# Patient Record
Sex: FEMALE
[De-identification: names, ages, dates, MRNs, and addresses within clinical notes are randomized per-mention and may not be internally consistent; named-entity substitution may affect disease eponyms.]

---

## 2017-06-19 ENCOUNTER — HOSPITAL ENCOUNTER (EMERGENCY)
Dept: HOSPITAL 31 - C.ER | Age: 27
Discharge: HOME | End: 2017-06-19
Payer: COMMERCIAL

## 2017-06-19 VITALS — TEMPERATURE: 98.6 F | HEART RATE: 80 BPM

## 2017-06-19 VITALS — DIASTOLIC BLOOD PRESSURE: 73 MMHG | SYSTOLIC BLOOD PRESSURE: 124 MMHG

## 2017-06-19 VITALS — RESPIRATION RATE: 16 BRPM

## 2017-06-19 VITALS — OXYGEN SATURATION: 98 %

## 2017-06-19 DIAGNOSIS — Z32.01: Primary | ICD-10-CM

## 2017-06-19 LAB
BILIRUB UR-MCNC: NEGATIVE MG/DL
GLUCOSE UR STRIP-MCNC: NORMAL MG/DL
KETONES UR STRIP-MCNC: NEGATIVE MG/DL
LEUKOCYTE ESTERASE UR-ACNC: (no result) LEU/UL
PH UR STRIP: 5 [PH] (ref 5–8)
PROT UR STRIP-MCNC: (no result) MG/DL
RBC # UR STRIP: NEGATIVE /UL
RBC #/AREA URNS HPF: < 1 /HPF (ref 0–3)
SP GR UR STRIP: 1.03 (ref 1–1.03)
UROBILINOGEN UR-MCNC: NORMAL MG/DL (ref 0.2–1)
WBC #/AREA URNS HPF: 1 /HPF (ref 0–5)

## 2017-06-19 NOTE — C.PDOC
History Of Present Illness


27 y/o F p/w abdominal pain x 1 week. Patient states she has diarrhea and 

nausea. She denies any vaginal bleeding or dysuria. She does note that she is 

attempting to get pregnant and primarily came to the ER for a pregnancy 

confirmation. She notes some yellow discharge earlier but denies any today.


Time Seen by Provider: 06/19/17 10:55


Chief Complaint (Nursing): Abdominal Pain





Past Medical History


Vital Signs: 


 Last Vital Signs











Temp  98.6 F   06/19/17 13:26


 


Pulse  80   06/19/17 13:26


 


Resp  16   06/19/17 13:26


 


BP  128/88   06/19/17 13:26


 


Pulse Ox  100   06/19/17 13:26











Family History: States: No Known Family Hx





- Social History


Hx Tobacco Use: No


Hx Alcohol Use: No


Hx Substance Use: No





- Immunization History


Hx Tetanus Toxoid Vaccination: No


Hx Influenza Vaccination: Yes


Hx Pneumococcal Vaccination: No





Review Of Systems


Except As Marked, All Systems Reviewed And Found Negative.


Constitutional: Negative for: Fever


Cardiovascular: Negative for: Chest Pain





Physical Exam





- Physical Exam


Additional Physical Exam Comments: 





Constitutional: No acute distress.


Head: Normocephalic. Atraumatic.


Eyes: PERRL.


ENT: Moist mucous membranes.


Neck: Supple.


Cardiovascular: Regular rate. Radial pulse 2+ bilaterally.


Chest: No tenderness.


Respiratory: Clear to auscultation bilaterally.


GI: Soft. Nontender. Nondistended.


Pelvic: Os closed. No CMT or adnexal tenderness. No discharge or bleeding.


Back: No CVA tenderness.


Musculoskeletal: No tenderness or swelling of extremities.


Skin: No rash.


Neurologic: Alert, no focal deficit





ED Course And Treatment


O2 Sat by Pulse Oximetry: 98





Medical Decision Making


Medical Decision Making: 


Patient pregnant. States she will follow up with OBGYN. Instructed to return to 

the ER immediately for pain or bleeding.





Disposition





- Disposition


Disposition: HOME/ ROUTINE


Disposition Time: 13:34


Condition: STABLE


Instructions:  Pregnancy (ED)





- Clinical Impression


Clinical Impression: 


 Pregnant

## 2017-07-27 ENCOUNTER — HOSPITAL ENCOUNTER (EMERGENCY)
Dept: HOSPITAL 31 - C.ER | Age: 27
Discharge: HOME | End: 2017-07-27
Payer: MEDICAID

## 2017-07-27 VITALS
DIASTOLIC BLOOD PRESSURE: 70 MMHG | HEART RATE: 60 BPM | SYSTOLIC BLOOD PRESSURE: 111 MMHG | RESPIRATION RATE: 16 BRPM | TEMPERATURE: 98.6 F

## 2017-07-27 VITALS — OXYGEN SATURATION: 100 %

## 2017-07-27 DIAGNOSIS — Z3A.09: ICD-10-CM

## 2017-07-27 DIAGNOSIS — O20.0: Primary | ICD-10-CM

## 2017-07-27 LAB
ALBUMIN/GLOB SERPL: 1.3 {RATIO} (ref 1–2.1)
ALP SERPL-CCNC: 56 U/L (ref 38–126)
ALT SERPL-CCNC: 29 U/L (ref 9–52)
AST SERPL-CCNC: 28 U/L (ref 14–36)
BASOPHILS # BLD AUTO: 0 K/UL (ref 0–0.2)
BASOPHILS NFR BLD: 0.5 % (ref 0–2)
BILIRUB SERPL-MCNC: 0.5 MG/DL (ref 0.2–1.3)
BILIRUB UR-MCNC: NEGATIVE MG/DL
BUN SERPL-MCNC: 6 MG/DL (ref 7–17)
CALCIUM SERPL-MCNC: 9.1 MG/DL (ref 8.6–10.4)
CHLORIDE SERPL-SCNC: 101 MMOL/L (ref 98–107)
CO2 SERPL-SCNC: 22 MMOL/L (ref 22–30)
EOSINOPHIL # BLD AUTO: 0 K/UL (ref 0–0.7)
EOSINOPHIL NFR BLD: 0.9 % (ref 0–4)
ERYTHROCYTE [DISTWIDTH] IN BLOOD BY AUTOMATED COUNT: 15.1 % (ref 11.5–14.5)
GLOBULIN SER-MCNC: 3.1 GM/DL (ref 2.2–3.9)
GLUCOSE SERPL-MCNC: 77 MG/DL (ref 65–105)
GLUCOSE UR STRIP-MCNC: NORMAL MG/DL
HCT VFR BLD CALC: 34.4 % (ref 34–47)
KETONES UR STRIP-MCNC: NEGATIVE MG/DL
LEUKOCYTE ESTERASE UR-ACNC: (no result) LEU/UL
LYMPHOCYTES # BLD AUTO: 1.4 K/UL (ref 1–4.3)
LYMPHOCYTES NFR BLD AUTO: 33.2 % (ref 20–40)
MCH RBC QN AUTO: 27.8 PG (ref 27–31)
MCHC RBC AUTO-ENTMCNC: 32.8 G/DL (ref 33–37)
MCV RBC AUTO: 84.7 FL (ref 81–99)
MONOCYTES # BLD: 0.5 K/UL (ref 0–0.8)
MONOCYTES NFR BLD: 10.9 % (ref 0–10)
NRBC BLD AUTO-RTO: 0 % (ref 0–2)
PH UR STRIP: 7 [PH] (ref 5–8)
PLATELET # BLD: 210 K/UL (ref 130–400)
PMV BLD AUTO: 9.6 FL (ref 7.2–11.7)
POTASSIUM SERPL-SCNC: 3.7 MMOL/L (ref 3.6–5.2)
PROT SERPL-MCNC: 7 G/DL (ref 6.3–8.3)
PROT UR STRIP-MCNC: NEGATIVE MG/DL
RBC # UR STRIP: NEGATIVE /UL
RBC #/AREA URNS HPF: 1 /HPF (ref 0–3)
SODIUM SERPL-SCNC: 137 MMOL/L (ref 132–148)
SP GR UR STRIP: 1.01 (ref 1–1.03)
UROBILINOGEN UR-MCNC: NORMAL MG/DL (ref 0.2–1)
WBC # BLD AUTO: 4.3 K/UL (ref 4.8–10.8)
WBC #/AREA URNS HPF: < 1 /HPF (ref 0–5)

## 2017-07-27 NOTE — US
Indication: Bleeding in pregnancy 



Comparison: None available 



Technique: Transabdominal pelvic ultrasound 



Findings: 



Uterus measures approximately 9.0 x 7.5 x 8.0 cm.  Anteverted.  

Cervix length measures approximately 2.9 cm. 



There is a single intrauterine fetus present.  The gestational sac 

measures 4.4 cm and is compatible with a gestational age of 9 weeks 6 

days. The crown-rump length measures 3.2 cm and is compatible with a 

gestational age of 10 weeks 0 days. 



There is fetal heart motion which measured 154.4 BPM. 



The right ovary measures 3.1 x 1.9 x 2.5 cm. The left ovary measures 

2.7 x 2.4 x 3.3 cm. Blood flow was demonstrated to both ovaries. 



Impression: 



Live single intrauterine pregnancy with estimated gestational age 9 

weeks 6 days by gestational sac calculation and 10 weeks 0 days by 

crown-rump length calculation.  Fetal heart rate 154.4 bpm. 



Advise an anomaly screen at 16-18 weeks gestational age

## 2017-07-27 NOTE — C.PDOC
History Of Present Illness





25 y/o female, , LMP 17 presents to emergency department with 

complaint of vaginal bleeding last night associated with lower abdominal pain. 

Patient states bleeding stopped today; now c/o upper back pain and headache. 

Denies any recent sonogram. Otherwise, denies urinary symptoms, fever, chills, 

nausea, vomiting, or other complaints. 


Time Seen by Provider: 17 11:14


Chief Complaint (Nursing): Female Genitourinary


History Per: Patient


History/Exam Limitations: no limitations


Onset/Duration Of Symptoms: Days


Current Symptoms Are (Timing): Still Present


Quality Of Discomfort: "Pain"


Associated Symptoms: denies: Fever, Chills, Nausea, Vomiting, Urinary Symptoms


Recent travel outside of the [place]: No


Abnormal Vaginal Bleeding: No





Past Medical History


Reviewed: Historical Data, Nursing Documentation, Vital Signs


Vital Signs: 


 Last Vital Signs











Temp  98.6 F   17 14:32


 


Pulse  60   17 14:32


 


Resp  16   17 14:32


 


BP  111/70   17 14:32


 


Pulse Ox  100   17 14:32














- Medical History


PMH: No Chronic Diseases


Family History: States: Unknown Family Hx





- Social History


Hx Tobacco Use: No


Hx Alcohol Use: No


Hx Substance Use: No





- Immunization History


Hx Tetanus Toxoid Vaccination: No


Hx Influenza Vaccination: Yes


Hx Pneumococcal Vaccination: No





Review Of Systems


Constitutional: Negative for: Fever, Chills


Cardiovascular: Negative for: Chest Pain


Respiratory: Negative for: Cough, Shortness of Breath, Wheezing


Gastrointestinal: Negative for: Nausea, Vomiting


Genitourinary: Positive for: Vaginal Bleeding (resolved)


Musculoskeletal: Positive for: Back Pain


Skin: Negative for: Rash


Neurological: Positive for: Headache.  Negative for: Weakness, Numbness, 

Dizziness





Physical Exam





- Physical Exam


Appears: Non-toxic, No Acute Distress


Skin: Normal Color, Warm, Dry


Head: Atraumatic, Normacephalic


Eye(s): bilateral: Normal Inspection, PERRL, EOMI


Nose: Normal


Oral Mucosa: Moist


Neck: Normal ROM, Supple


Chest: Symmetrical


Cardiovascular: Rhythm Regular, No Murmur


Respiratory: Normal Breath Sounds, No Rales, No Rhonchi, No Wheezing


Gastrointestinal/Abdominal: Soft, No Tenderness, No Guarding, No Rebound


Back: No Vertebral Tenderness, Paraspinal Tenderness (thoracic, bilaterally)


Pelvic: Normal Bimanual Exam, No Vaginal Bleeding, No Vaginal Discharge, No 

Cervical Motion Tenderness, No Adnexal Tenderness


Extremity: Normal ROM, Capillary Refill (< 2 sec.  )


Neurological/Psych: Oriented x3, Normal Speech, Normal Cognition





ED Course And Treatment





- Laboratory Results


Result Diagrams: 


 17 12:51





 17 12:51


O2 Sat by Pulse Oximetry: 100 (RA)


Pulse Ox Interpretation: Normal





- CT Scan/US


  ** US Pregnancy 1st Trimester


Other Rad Studies (CT/US): Read By Radiologist, Radiology Report Reviewed


CT/US Interpretation: Findings:  Uterus measures approximately 9.0 x 7.5 x 8.0 

cm.  Anteverted.  Cervix length measures approximately 2.9 cm.  There is a 

single intrauterine fetus present.  The gestational sac measures 4.4 cm and is 

compatible with a gestational age of 9 weeks 6 days. The crown-rump length 

measures 3.2 cm and is compatible with a gestational age of 10 weeks 0 days.  

There is fetal heart motion which measured 154.4 BPM.  The right ovary measures 

3.1 x 1.9 x 2.5 cm. The left ovary measures 2.7 x 2.4 x 3.3 cm. Blood flow was 

demonstrated to both ovaries.  Impression:  Live single intrauterine pregnancy 

with estimated gestational age 9 weeks 6 days by gestational sac calculation 

and 10 weeks 0 days by crown-rump length calculation.  Fetal heart rate 154.4 

bpm.  Advise an anomaly screen at 16-18 weeks gestational age





Medical Decision Making


Medical Decision Making: 





Plan:


* Labs


* UA


* Ultrasound


* Reassess





Prior Visits:


Notes and results from previous visits were reviewed





Progress Notes: pt reports headache and upper back pain resolved. pt has no 

bleeding at this time, will d/c pt with lab results and sono results to bring 

to her gyn appt this coming tues. 





Disposition


Counseled Patient/Family Regarding: Studies Performed, Diagnosis, Need For 

Followup





- Disposition


Disposition: HOME/ ROUTINE


Disposition Time: 15:04


Condition: STABLE


Additional Instructions: 





Siga con chauhan mdico el joe segn lo programado; Traiga resutls del 

laboratorio y resultados del sonograma con usted. Nada en la vagina hasta que 

sea visto por chauhan gineclogo, sin sexo, sin tampones. Regrese a ER para 

cualquier sangrado abundante (ms de jaswant almohadilla por hora); O cualquier 

otra preocupaci


Forms:  [facility] (English), Gen Discharge Inst [facility]





- Clinical Impression


Clinical Impression: 


 Threatened 








- PA / NP / Resident Statement


MD/DO has reviewed & agrees with the documentation as recorded.





- Scribe Statement


The provider has reviewed the documentation as recorded by the Scribe





Jerel Silver





All medical record entries made by the Scribe were at my direction and 

personally dictated by me. I have reviewed the chart and agree that the record 

accurately reflects my personal performance of the history, physical exam, 

medical decision making, and the department course for this patient. I have 

also personally directed, reviewed, and agree with the discharge instructions 

and disposition.

## 2018-02-23 ENCOUNTER — HOSPITAL ENCOUNTER (INPATIENT)
Dept: HOSPITAL 31 - C.EROB | Age: 28
LOS: 2 days | Discharge: HOME | DRG: 373 | End: 2018-02-25
Attending: OBSTETRICS & GYNECOLOGY | Admitting: OBSTETRICS & GYNECOLOGY
Payer: COMMERCIAL

## 2018-02-23 DIAGNOSIS — Z3A.40: ICD-10-CM

## 2018-02-23 LAB
BASOPHILS # BLD AUTO: 0 K/UL (ref 0–0.2)
BASOPHILS NFR BLD: 0.3 % (ref 0–2)
BILIRUB UR-MCNC: NEGATIVE MG/DL
EOSINOPHIL # BLD AUTO: 0.1 K/UL (ref 0–0.7)
EOSINOPHIL NFR BLD: 0.7 % (ref 0–4)
ERYTHROCYTE [DISTWIDTH] IN BLOOD BY AUTOMATED COUNT: 15.1 % (ref 11.5–14.5)
GLUCOSE UR STRIP-MCNC: NORMAL MG/DL
HGB BLD-MCNC: 10.9 G/DL (ref 11–16)
LEUKOCYTE ESTERASE UR-ACNC: (no result) LEU/UL
LYMPHOCYTES # BLD AUTO: 2.1 K/UL (ref 1–4.3)
LYMPHOCYTES NFR BLD AUTO: 27.6 % (ref 20–40)
MCH RBC QN AUTO: 29.9 PG (ref 27–31)
MCHC RBC AUTO-ENTMCNC: 35 G/DL (ref 33–37)
MCV RBC AUTO: 85.5 FL (ref 81–99)
MONOCYTES # BLD: 0.7 K/UL (ref 0–0.8)
MONOCYTES NFR BLD: 8.7 % (ref 0–10)
NEUTROPHILS # BLD: 4.8 K/UL (ref 1.8–7)
NEUTROPHILS NFR BLD AUTO: 62.7 % (ref 50–75)
NRBC BLD AUTO-RTO: 0 % (ref 0–2)
PH UR STRIP: 7 [PH] (ref 5–8)
PLATELET # BLD: 206 K/UL (ref 130–400)
PMV BLD AUTO: 9.5 FL (ref 7.2–11.7)
PROT UR STRIP-MCNC: NEGATIVE MG/DL
RBC # BLD AUTO: 3.65 MIL/UL (ref 3.8–5.2)
RBC # UR STRIP: (no result) /UL
SP GR UR STRIP: 1.01 (ref 1–1.03)
SQUAMOUS EPITHIAL: 2 /HPF (ref 0–5)
URINE NITRATE: NEGATIVE
UROBILINOGEN UR-MCNC: NORMAL MG/DL (ref 0.2–1)
WBC # BLD AUTO: 7.6 K/UL (ref 4.8–10.8)

## 2018-02-23 PROCEDURE — 10907ZC DRAINAGE OF AMNIOTIC FLUID, THERAPEUTIC FROM PRODUCTS OF CONCEPTION, VIA NATURAL OR ARTIFICIAL OPENING: ICD-10-PCS | Performed by: OBSTETRICS & GYNECOLOGY

## 2018-02-23 PROCEDURE — 0HQ9XZZ REPAIR PERINEUM SKIN, EXTERNAL APPROACH: ICD-10-PCS | Performed by: OBSTETRICS & GYNECOLOGY

## 2018-02-23 NOTE — OBHP
===========================

Datetime: 2018 07:16

===========================

   

IP Adm Impression:  Term, intrauterine pregnancy; Active labor

IP Admit Plan:  Admit to unit; Initiate labor protocol

Admit Comment, IP Provider:   at 41week came woith c/o ctxs and vb started early morning , irrg 6
/10,no vb, lof+fm

   obhx primi

   pmh den

   med pnv

   all nkda

   psh d

   soch de

      

   ve /-1

      

   a/p  at 41weeks in active labor

   admit to l_d

   npo/ivf

   labs

   cont to and efm

   painmanage

   pen g

   anticipate 

Pelvic Type - PN:  Adequate

Extremities - PN:  Normal

Abdomen - PN:  Normal

Back - PN:  Normal

Breast - PN:  Normal

Lungs - PN:  Normal

Heart - PN:  Normal

Thyroid - PN:  Normal

Neurologic - PN:  Normal

HEENT - PN:  Normal

General - PN:  Normal

FHR - Baseline A Provider:  130

Contraction Comments Provider:  q1-4

Comments, ACOG Physical Exam:  gravid,non tender

   ext no edema,no calf te

IP Prenatal Hx Assessment:  The Prenatal History has been Reviewed and is Current

EGA AdmitDate IP:  40.6

Vital Signs Provider:  Reviewed; Within Normal Limits

IP Chief Complaint:  Uterine contractions; Vaginal bleeding

NICHD Variability Prov Fetus A:  Moderate 6-25bpm

NICHD Accel Fetus A IP Provider:  15X15

FHR Category Provider Fetus A:  Category I

Dilatation, Provider:  4

Effacement, Provider:  80

Station, Provider:  -1

Genitourinary Exam:  Normal

DTRs - PN:  Normal

## 2018-02-23 NOTE — OBADHP
===========================

Datetime: 2018 07:16

===========================

   

Admit Comment, IP Provider:   at 41week came woith c/o ctxs and vb started early morning , irrg 6
10,no vb, lof+fm

   obhx primi

   pmh den

   med pnv

   all nkda

   psh d

   soch de

      

   ve /-1

      

   a/p  at 41weeks in active labor

   admit to l_d

   npo/ivf

   labs

   cont to and efm

   painmanage

   pen g

   anticipate 

Pelvic Type - PN:  Adequate

Extremities - PN:  Normal

Abdomen - PN:  Normal

Back - PN:  Normal

Breast - PN:  Normal

Lungs - PN:  Normal

Heart - PN:  Normal

Thyroid - PN:  Normal

Neurologic - PN:  Normal

HEENT - PN:  Normal

General - PN:  Normal

FHR - Baseline A Provider:  130

Contraction Comments Provider:  q1-4

Comments, ACOG Physical Exam:  gravid,non tender

   ext no edema,no calf te

IP Prenatal Hx Assessment:  The Prenatal History has been Reviewed and is Current

Vital Signs Provider:  Reviewed; Within Normal Limits

IP Chief Complaint:  Uterine contractions; Vaginal bleeding

NICHD Variability Prov Fetus A:  Moderate 6-25bpm

NICHD Accel Fetus A IP Provider:  15X15

FHR Category Provider Fetus A:  Category I

Dilatation, Provider:  4

Effacement, Provider:  80

Station, Provider:  -1

Genitourinary Exam:  Normal

DTRs - PN:  Normal

EGA AdmitDate IP:  40.6

IP Adm Impression:  Term, intrauterine pregnancy; Active labor

IP Admit Plan:  Admit to unit; Initiate labor protocol

## 2018-02-23 NOTE — OBPN
===========================

Datetime: 2018 13:23

===========================

   

IP Progress Impression:  Normal progression of labor

IP Procedures:  Artificial ROM; Sterile Vag Exam

Contraction Comments Provider:  irrg

FHR - Baseline A Provider:  130

IP Progress Note Comment:  pt was examined at bed side

   ve 4/80/-2

   arom clear

   cont pitoc

   anticipate 

NICHD Accel Fetus A IP Provider:  15X15

FHR Category Provider Fetus A:  Category I

NICHD Variability Prov Fetus A:  Moderate 6-25bpm

Dilatation, Provider:  4

Effacement, Provider:  80

Station, Provider:  -2

   

===========================

Datetime: 2018 07:16

===========================

   

Vital Signs Provider:  Reviewed; Within Normal Limits

## 2018-02-23 NOTE — OBDS
===================================

DELIVERY PERSONNEL

===================================

   

Delivery Doctor:  Perry Hamilton MD

Circulator:  Jevon Rehman RN

Anesthesiologist:  corrine

   

===================================

MATERNAL INFORMATION

===================================

   

Estimated  Blood Loss (ml):  350

Other Maternal Complications:  +GBS

Provider Comments:  baby deliverd in [place]

   end clean

   no com

   

===================================

LABOR SUMMARY

===================================

   

EDC:  2018 00:00

No. Babies in Womb:  1

 Attempted:  No

   

===================================

LABOR INFORMATION

===================================

   

Reason for Induction:  Not Applicable

Onset of Labor:  2018 05:00

Oxytocin:  Augmentation

Group B Beta Strep:  Positive

Antibiotics # of Doses:  3

Antibiotics Time of Last Dose:  15:30pm

Steroids Given:  None

Reason Steroids Not Administered:  Not Applicable

   

===================================

MEMBRANES

===================================

   

Membranes Rupture Method:  Artificial

Rupture of Membranes:  2018 13:30

Amniotic Fluid Color:  Heavy Meconium

Amniotic Fluid Amount:  Small

Amniotic Fluid Odor:  Normal

   

===================================

VAGINAL DELIVERY

===================================

   

Episiotomy:  None

Laceration Extension:  First Degree

Laceration Type:  Perineal; Periurethral

Laceration Repair Note:  repaired with 3 chromic

   

===================================

BABY A INFORMATION

===================================

   

Born in [place] :  No

   

===================================

PRESENTATION/POSITION BABY A

===================================

   

Presentation:  Cephalic

Cephalic Presentation:  Vertex

Vertex Position:  Left Occipital Anterior

Breech Presentation:  N/A

   

===================================

PLACENTA INFORMATION BABY A

===================================

   

Placenta Method of Delivery:  Spontaneous

Placenta Status:  Delivered

   

===================================

INFANT INFORMATION BABY A

===================================

   

Gestational Age at Delivery:  40.6

Gestational Status:  Term

Infant Sex:  Male

   

===================================

IDENTIFICATION/MEDS BABY A

===================================

   

ID Band Number:  [id number]

Sensor Number:  [id number]

   

===================================

CORD INFORMATION BABY A

===================================

   

Nuchal Cord :  N/A

## 2018-02-24 VITALS — RESPIRATION RATE: 18 BRPM

## 2018-02-24 LAB
ERYTHROCYTE [DISTWIDTH] IN BLOOD BY AUTOMATED COUNT: 15.1 % (ref 11.5–14.5)
HGB BLD-MCNC: 9 G/DL (ref 11–16)
MCH RBC QN AUTO: 29.6 PG (ref 27–31)
MCHC RBC AUTO-ENTMCNC: 34.1 G/DL (ref 33–37)
MCV RBC AUTO: 86.6 FL (ref 81–99)
PLATELET # BLD: 173 K/UL (ref 130–400)
PMV BLD AUTO: 9.4 FL (ref 7.2–11.7)
RBC # BLD AUTO: 3.05 MIL/UL (ref 3.8–5.2)
WBC # BLD AUTO: 13 K/UL (ref 4.8–10.8)

## 2018-02-24 NOTE — OBPPN
===========================

Datetime: 2018 08:26

===========================

   

PP Pain Prov:  Within normal limits

PP Nausea Prov:  Denies

PP Flatus Prov:  Yes

PP BM Prov:  No

PP Breasts Prov:  Normal

PP Heart Prov:  Normal

PP Lungs Prov:  Normal

PP Abdomen/Uterus Prov:  Normal

PP Lochia Prov:  Normal

PP Vulva/Perineum Prov:  Normal

PP CVA Tenderness Prov:  Normal

PP Extremities Prov:  Normal

PP C/S Incision Prov:  Not Applicable

PP Breastfeeding Progress Prov:  Not Applicable

PP Impression Prov:  Normal postpartum progression

PP Plan Prov:  Continue present management

PP Progress Note Prov:  pt seen adn examined and reports pain controlled with medicain. pt is tolerat
ing diet without nause, vomiting, ambuating, voiding, passing flatus, denies any fever, chills, cp, s
ob, lightheadness, headahces. pt is breastfeeding.

      

   VSS

   PE GEN NAD< AAO x 3

   RESP: CTAB?L

   CVS: RRR< +S1/S2

   ABD: Soft, NT/ND, +BS, no guarding, no rebound tendneren no rigidty, 

   FUNDUS: FIrm, at level of umbilucsi

   VE: Minimal lochia, non fouls smelling

   EXT: no calf tenderness, negative kendy's sign

      

   A/P s/p  PPD #1 doing well

   -f/u am lab

   -pain manamgnet

   -encourage breast feedign adn ambaution

IP PP Procedures:  None

Vital Signs Provider PP:  Reviewed; Within Normal Limits

## 2018-02-25 VITALS
TEMPERATURE: 98 F | DIASTOLIC BLOOD PRESSURE: 69 MMHG | HEART RATE: 86 BPM | SYSTOLIC BLOOD PRESSURE: 115 MMHG | OXYGEN SATURATION: 100 %

## 2018-02-25 NOTE — OBPPN
===========================

Datetime: 2018 08:48

===========================

   

PP Pain Prov:  Within normal limits

PP Nausea Prov:  Denies

PP Flatus Prov:  Yes

PP BM Prov:  No

PP Heart Prov:  Normal

PP Lungs Prov:  Normal

PP Abdomen/Uterus Prov:  Normal

PP Lochia Prov:  Normal

PP Extremities Prov:  Normal

PP Breastfeeding Progress Prov:  Normal

PP Impression Prov:  Normal postpartum progression

PP Plan Prov:  Continue present management; Discharge

PP Progress Note Prov:  Patient seen and examined at bedside. Per nursing no acute events overnight. 
Patient is doing well, pain is controlled. Lochia is mild. Ambulating and tolerating diet. Passing fl
atus, no BM. Urinating without difficulty. Bottle feeding. Denies headaches, dizziness, cp, palpitati
ons, sob, urinary symptoms.

      

   VS: 115/69  86  98.0

   Gen: AAOx3

   Abd: Soft, fundus firm below umbilicus

   Ext: No clubbing, cyanosis, edema; no calf tenderness

      

   Labs: 7.6>10.9/31.2<206  13.0>9.0/26.4<173

   O positive  Rubella immune

      

   A/P: 27 year old  at 40w6d s/p  PPD#2

   -Stable, afebrile

   -Pain control: motrin prn

   -Encourage ambulation and hydration

   -Encourage breastfeeding

   -Continue routine postpartum care

   -D/C home today - pelvic rest x 6 weeks, f/u with clinic in 6 weeks 

   -Plan discussed with Dr Juan Thacker DO PGY-1

      

   pt seen and examiend 

   agree valentine baptiste

   OK home

   c/u clinic 6 weke

   precauton given

Vital Signs Provider PP:  Reviewed; Within Normal Limits

## 2018-02-25 NOTE — OBDCSUM
===========================

Datetime: 02/25/2018 08:53

===========================

   

Discharged to, Provider:  Home

Follow up at, Provider:  [facility]

Disch Instr Activity:  Normal activity

Disch Instr Diet:  Regular

Discharge Instructions, Provider:  Routine instructions given

Discharge Diagnosis, Provider:  Term Pregnancy Delivered

Discharge Time:  02/25/2018 10:00

Follow up in weeks, Provider:  6 weeks

Disch Referrals:  None

Disch Activity Restrictions:  No sexual activity; Nothing in vagina - [place], tampons, douche

Discharge Comment, Provider:  precautin given